# Patient Record
Sex: FEMALE | Race: WHITE | HISPANIC OR LATINO | URBAN - METROPOLITAN AREA
[De-identification: names, ages, dates, MRNs, and addresses within clinical notes are randomized per-mention and may not be internally consistent; named-entity substitution may affect disease eponyms.]

---

## 2021-10-16 ENCOUNTER — EMERGENCY (EMERGENCY)
Facility: HOSPITAL | Age: 46
LOS: 0 days | Discharge: HOME | End: 2021-10-16
Attending: STUDENT IN AN ORGANIZED HEALTH CARE EDUCATION/TRAINING PROGRAM
Payer: COMMERCIAL

## 2021-10-16 VITALS
HEART RATE: 91 BPM | WEIGHT: 115.08 LBS | SYSTOLIC BLOOD PRESSURE: 153 MMHG | RESPIRATION RATE: 17 BRPM | HEIGHT: 60 IN | OXYGEN SATURATION: 100 % | DIASTOLIC BLOOD PRESSURE: 96 MMHG | TEMPERATURE: 98 F

## 2021-10-16 DIAGNOSIS — R11.2 NAUSEA WITH VOMITING, UNSPECIFIED: ICD-10-CM

## 2021-10-16 DIAGNOSIS — G43.909 MIGRAINE, UNSPECIFIED, NOT INTRACTABLE, WITHOUT STATUS MIGRAINOSUS: ICD-10-CM

## 2021-10-16 DIAGNOSIS — R51.9 HEADACHE, UNSPECIFIED: ICD-10-CM

## 2021-10-16 DIAGNOSIS — Z88.8 ALLERGY STATUS TO OTHER DRUGS, MEDICAMENTS AND BIOLOGICAL SUBSTANCES STATUS: ICD-10-CM

## 2021-10-16 PROCEDURE — 99284 EMERGENCY DEPT VISIT MOD MDM: CPT | Mod: 25

## 2021-10-16 PROCEDURE — 96365 THER/PROPH/DIAG IV INF INIT: CPT

## 2021-10-16 PROCEDURE — 99284 EMERGENCY DEPT VISIT MOD MDM: CPT

## 2021-10-16 PROCEDURE — 96375 TX/PRO/DX INJ NEW DRUG ADDON: CPT

## 2021-10-16 RX ORDER — ONDANSETRON 8 MG/1
4 TABLET, FILM COATED ORAL ONCE
Refills: 0 | Status: COMPLETED | OUTPATIENT
Start: 2021-10-16 | End: 2021-10-16

## 2021-10-16 RX ORDER — DEXAMETHASONE 0.5 MG/5ML
10 ELIXIR ORAL ONCE
Refills: 0 | Status: COMPLETED | OUTPATIENT
Start: 2021-10-16 | End: 2021-10-16

## 2021-10-16 RX ORDER — KETOROLAC TROMETHAMINE 30 MG/ML
30 SYRINGE (ML) INJECTION ONCE
Refills: 0 | Status: DISCONTINUED | OUTPATIENT
Start: 2021-10-16 | End: 2021-10-16

## 2021-10-16 RX ORDER — DIPHENHYDRAMINE HCL 50 MG
25 CAPSULE ORAL ONCE
Refills: 0 | Status: COMPLETED | OUTPATIENT
Start: 2021-10-16 | End: 2021-10-16

## 2021-10-16 RX ORDER — SODIUM CHLORIDE 9 MG/ML
1000 INJECTION INTRAMUSCULAR; INTRAVENOUS; SUBCUTANEOUS ONCE
Refills: 0 | Status: COMPLETED | OUTPATIENT
Start: 2021-10-16 | End: 2021-10-16

## 2021-10-16 RX ADMIN — SODIUM CHLORIDE 1000 MILLILITER(S): 9 INJECTION INTRAMUSCULAR; INTRAVENOUS; SUBCUTANEOUS at 16:36

## 2021-10-16 RX ADMIN — ONDANSETRON 4 MILLIGRAM(S): 8 TABLET, FILM COATED ORAL at 16:35

## 2021-10-16 RX ADMIN — Medication 25 MILLIGRAM(S): at 16:35

## 2021-10-16 RX ADMIN — Medication 30 MILLIGRAM(S): at 17:46

## 2021-10-16 RX ADMIN — Medication 30 MILLIGRAM(S): at 16:46

## 2021-10-16 RX ADMIN — Medication 10 MILLIGRAM(S): at 18:51

## 2021-10-16 RX ADMIN — Medication 102 MILLIGRAM(S): at 18:19

## 2021-10-16 NOTE — ED STATDOCS - PATIENT PORTAL LINK FT
You can access the FollowMyHealth Patient Portal offered by [facility] by registering at the following website: [email or web address]. By joining [employer]’s FollowMyHealth portal, you will also be able to view your health information using other applications (apps) compatible with our system.

## 2021-10-16 NOTE — ED STATDOCS - ATTENDING CONTRIBUTION TO CARE
I, Elaine Ugarte DO,  performed the initial face to face bedside interview with this patient regarding history of present illness, review of symptoms and relevant past medical, social and family history.  I completed an independent physical examination.  I was the initial provider who evaluated this patient. I have signed out the follow up of any pending tests (i.e. labs, radiological studies) to the resident.  I have communicated the patient’s plan of care and disposition with the resident.  The history, relevant review of systems, past medical and surgical history, medical decision making, and physical examination was documented by the scribe in my presence and I attest to the accuracy of the documentation.

## 2021-10-16 NOTE — ED STATDOCS - OBJECTIVE STATEMENT
46b y.o female with Hx of known migraines, visiting from [place] presents to the ED c/o L sided headache since yesterday, with associated nausea and vomiting. Pt denies fever, trauma. injury, weakness, numbness. Pt took all took all her abortive medication with no relief. 46 y.o female with Hx of known migraines, visiting from [place] presents to the ED c/o L sided headache since yesterday, with associated nausea and vomiting. Pt denies fever, trauma. injury, weakness, numbness. Pt took all took all her abortive medication with no relief. hx of migraines with known symptoms in the past.

## 2021-10-16 NOTE — ED ADULT TRIAGE NOTE - CHIEF COMPLAINT QUOTE
Pt c/o Migraine Headache that unrelieved by Immitrex. +nausea and photophobia. Last took Immitrex 0700

## 2021-10-16 NOTE — ED STATDOCS - NSFOLLOWUPINSTRUCTIONS_ED_ALL_ED_FT
Acute Headache    WHAT YOU NEED TO KNOW:    An acute headache is pain or discomfort that starts suddenly and gets worse quickly. You may have an acute headache only when you feel stress or eat certain foods. Other acute headache pain can happen every day, and sometimes several times a day.     DISCHARGE INSTRUCTIONS:    Seek care immediately if:   •You have severe pain.      •You have numbness or weakness on one side of your face or body.      •You have a headache that occurs after a blow to the head, a fall, or other trauma.       •You have a headache, are forgetful or confused, or have trouble speaking.      •You have a headache, stiff neck, and a fever.      Contact your healthcare provider if:   •You have a constant headache and are vomiting.      •You have a headache each day that does not get better, even after treatment.      •You have changes in your headaches, or new symptoms that occur when you have a headache.      •You have questions or concerns about your condition or care.      Medicines: You may need any of the following:   •Prescription pain medicine may be given. The medicine your healthcare provider recommends will depend on the kind of headaches you have. You will need to take prescription headache medicines as directed to prevent a problem called rebound headache. These headaches happen with regular use of pain relievers for headache disorders.      •NSAIDs, such as ibuprofen, help decrease swelling, pain, and fever. This medicine is available with or without a doctor's order. NSAIDs can cause stomach bleeding or kidney problems in certain people. If you take blood thinner medicine, always ask your healthcare provider if NSAIDs are safe for you. Always read the medicine label and follow directions.      •Acetaminophen decreases pain and fever. It is available without a doctor's order. Ask how much to take and how often to take it. Follow directions. Read the labels of all other medicines you are using to see if they also contain acetaminophen, or ask your doctor or pharmacist. Acetaminophen can cause liver damage if not taken correctly. Do not use more than 3 grams (3,000 milligrams) total of acetaminophen in one day.       •Antidepressants may be given for some kinds of headaches.       •Take your medicine as directed. Contact your healthcare provider if you think your medicine is not helping or if you have side effects. Tell him or her if you are allergic to any medicine. Keep a list of the medicines, vitamins, and herbs you take. Include the amounts, and when and why you take them. Bring the list or the pill bottles to follow-up visits. Carry your medicine list with you in case of an emergency.      Manage your symptoms:   •Apply heat or ice on the headache area. Use a heat or ice pack. For an ice pack, you can also put crushed ice in a plastic bag. Cover the pack or bag with a towel before you apply it to your skin. Ice and heat both help decrease pain, and heat also helps decrease muscle spasms. Apply heat for 20 to 30 minutes every 2 hours. Apply ice for 15 to 20 minutes every hour. Apply heat or ice for as long and for as many days as directed. You may alternate heat and ice.      •Relax your muscles. Lie down in a comfortable position and close your eyes. Relax your muscles slowly. Start at your toes and work your way up your body.      •Keep a record of your headaches. Write down when your headaches start and stop. Include your symptoms and what you were doing when the headache began. Record what you ate or drank for 24 hours before the headache started. Describe the pain and where it hurts. Keep track of what you did to treat your headache and if it worked.       Prevent an acute headache:   •Avoid anything that triggers an acute headache. Examples include exposure to chemicals, going to high altitude, or not getting enough sleep. Create a regular sleep routine. Go to sleep at the same time and wake up at the same time each day. Do not use electronic devices before bedtime. These may trigger a headache or prevent you from sleeping well.      •Do not smoke. Nicotine and other chemicals in cigarettes and cigars can trigger an acute headache or make it worse. Ask your healthcare provider for information if you currently smoke and need help to quit. E-cigarettes or smokeless tobacco still contain nicotine. Talk to your healthcare provider before you use these products.       •Limit alcohol as directed. Alcohol can trigger an acute headache or make it worse. If you have cluster headaches, do not drink alcohol during an episode. For other types of headaches, ask your healthcare provider if it is safe for you to drink alcohol. Ask how much is safe for you to drink, and how often.      •Exercise as directed. Exercise can reduce tension and help with headache pain. Aim for 30 minutes of physical activity on most days of the week. Your healthcare provider can help you create an exercise plan.      •Eat a variety of healthy foods. Healthy foods include fruits, vegetables, low-fat dairy products, lean meats, fish, whole grains, and cooked beans. Your healthcare provider or dietitian can help you create meals plans if you need to avoid foods that trigger headaches.      Follow up with your healthcare provider as directed: Bring your headache record with you when you see your healthcare provider. Write down your questions so you remember to ask them during your visits.      Dolor de james akanksha    LO QUE NECESITA SABER:    El dolor de james akanksha es un dolor o molestia que comienza de repente y empeora rápidamente. Usted puede tener un dolor de james akanksha sólo cuando siente estrés o come ciertos alimentos. Otro tipo dolor de james akanksha puede producirse todos los días y a veces varias veces al día.    INSTRUCCIONES SOBRE EL [facility]IS HOSPITALARIA:    Busque atención médica de inmediato si:  •Usted tiene dolor intenso.      •Usted tiene entumecimiento en un lado de santos cynthia o cuerpo.      •Usted tiene un dolor de james que ocurre después de un golpe en la james, olu caída u otro trauma.      •Tiene dolor de james, está olvidadizo o confundido o tiene dificultad para hablar.      •Tiene dolor de james, rigidez en el iain y fiebre.      Comuníquese con santos médico si:  •Usted tiene un dolor de james michelle y está vomitando.      •Usted tiene dolor de james todos los días y no se spenser aun después de tratarlo.      •Jimena nikita de james cambian u ocurren nuevos síntomas cuando tiene dolor de james.      •Usted tiene preguntas o inquietudes acerca de santos condición o cuidado.      Medicamentos:Es posible que usted necesite alguno de los siguientes:   •Los analgésicos recetadospodrían administrarse. El medicamento que recomienda santos médico dependerá del tipo de dolor de james que tenga. Usted necesitará willy medicamentos para el dolor de james según las indicaciones para evitar un problema llamado dolor de james de rebote. Estos nikita de james ocurren con el uso regular de analgésicos para los trastornos de dolor de james.      •Los NAOMI,baltazar el ibuprofeno, ayudan a disminuir la inflamación, el dolor y la fiebre. [place] medicamento está disponible con o sin olu receta médica. Los NAOMI pueden causar sangrado estomacal o problemas renales en ciertas personas. Si usted bren un medicamento anticoagulante, siempre pregúntele a santos médico si los NAOMI son seguros para usted. Siempre maldonado la etiqueta de [place] medicamento y siga las instrucciones.      •Acetaminofénalivia el dolor y baja la fiebre. Está disponible sin receta médica. Pregunte la cantidad y la frecuencia con que debe tomarlos. Siga las indicaciones. Maldonado las etiquetas de todos los demás medicamentos que esté usando para saber si también contienen acetaminofén, o pregunte a santos médico o farmacéutico. El acetaminofén puede causar daño en el hígado cuando no se bren de forma correcta. No use más de 3 gramos (3,000 miligramos) en total de acetaminofeno en un día.      •Antidepresivosse pueden administrar para algunos tipos de nikita de james.      •[place] jimena medicamentos baltazar se le haya indicado.Consulte con santos médico si usted carmen que santos medicamento no le está ayudando o si presenta efectos secundarios. Infórmele si es alérgico a cualquier medicamento. Mantenga olu lista actualizada de los medicamentos, las vitaminas y los productos herbales que bren. Incluya los siguientes datos de los medicamentos: cantidad, frecuencia y motivo de administración. Traiga con usted la lista o los envases de las píldoras a jimena citas de seguimiento. Lleve la lista de los medicamentos con usted en jenny de olu emergencia.      El manejo de jimena síntomas:  •Aplique hielo o caloren la haley donde santos hijo siente el dolor de james. Utilice un paquete (compresa) de hielo o calor. Para un paquete de hielo, también puede colocar hielo molido en olu bolsa plástica. Cubra el paquete de hielo o la bolsa con olu toalla pequeña antes de aplicarla en la piel. Tanto el hielo baltazar el calor ayudan a reducir el dolor, y el calor también contribuye a reducir los espasmos musculares. Aplique calor jama 20 a 30 minutos cada 2 horas. Aplique hielo jama 15 a 20 minutos cada hora. Aplique calor o hielo jama el tiempo y la cantidad de días que se le indique. Usted puede alternar el calor y el hielo.      •Relaje jimena músculos.Acuéstese en olu posición cómoda y cierre jimena ojos. Relaje jimena músculos lentamente. Comience por los dedos de los pies y avance hacia arriba al tanika de santos cuerpo.      •Registre en un diario jimena nikita de james.Escriba cuándo comienzan y terminan jimena migrañas. Incluya jimena síntomas y qué estaba haciendo cuando comenzó la migraña. Registre lo que comió y lo que tomó las 24 horas previas al comienzo de santos migraña. Describa el dolor y dónde le duele: Lleve un registro de lo que hizo para tratar santos migraña y si obtuvo un resultado satisfactorio.      Cómo prevenir un dolor de james akanksha:  •Evite cualquier cosa que provoque un dolor de james akanksha.Los ejemplos incluyen la exposición a sustancias químicas, las grandes altitudes o no dormir lo suficiente. Carmen olu rutina para dormir. Acuéstese y levántese todos los días a la misma hora. No utilice aparatos electrónicos antes de acostarse. Pueden provocarle un dolor de james o impedirle dormir abimael.      •No fume.La nicotina y otras sustancias químicas en los cigarrillos y puros pueden desencadenar un dolor de james akanksha o empeorarlo. Pida información a santos médico si usted actualmente fuma y necesita ayuda para dejar de fumar. Los cigarrillos electrónicos o el tabaco sin humo igualmente contienen nicotina. Consulte con santos médico antes de utilizar estos productos.      •Limite el consumo de alcohol según le indicaron.El alcohol puede provocar un dolor de james akanksha o empeorarlo. Si usted tiene nikita de james de racimo, no magdaleno alcohol jama un episodio. Para otros tipos de nikita de james, pregúntele a santos proveedor de atención médica si es seguro para usted beber alcohol. Pregunte cuál es la cantidad adair que puede beber y con qué frecuencia.      •Ejercítese según indicaciones.El ejercicio puede reducir la tensión y ayudarlo a aliviar el dolor de james. Propóngase hacer 30 minutos de actividad física sergio todos los días de la semana. Santos médico puede ayudarle a crear un plan de ejercicios.      •Consuma alimentos saludables y variados.Los alimentos saludables incluyen las frutas, verduras, productos lácteos bajos en grasa, aurelia magras, pescado y frijoles cocidos. Santos médico o dietista puede ayudarle a crear planes de comidas si desea evitar los alimentos que provocan nikita de james.      Acuda a jimena consultas de control con santos médico según le indicaron.Traiga santos registro de nikita de james con usted cuando visite a santos médico. Anote jimena preguntas para que se acuerde de hacerlas jama jimena visitas.

## 2021-10-16 NOTE — ED STATDOCS - PROGRESS NOTE DETAILS
Torito DO: patient feeling better on re-eval; neuro exam non focal; strict return precautions given.